# Patient Record
Sex: MALE | Race: WHITE | NOT HISPANIC OR LATINO | URBAN - METROPOLITAN AREA
[De-identification: names, ages, dates, MRNs, and addresses within clinical notes are randomized per-mention and may not be internally consistent; named-entity substitution may affect disease eponyms.]

---

## 2023-07-28 ENCOUNTER — NURSING HOME VISIT (OUTPATIENT)
Dept: POST ACUTE CARE | Facility: EXTERNAL LOCATION | Age: 88
End: 2023-07-28
Payer: MEDICARE

## 2023-07-28 DIAGNOSIS — K92.2 GASTROINTESTINAL HEMORRHAGE, UNSPECIFIED GASTROINTESTINAL HEMORRHAGE TYPE: Primary | ICD-10-CM

## 2023-07-28 DIAGNOSIS — I10 HYPERTENSION, UNSPECIFIED TYPE: ICD-10-CM

## 2023-07-28 PROCEDURE — 99304 1ST NF CARE SF/LOW MDM 25: CPT | Performed by: INTERNAL MEDICINE

## 2023-07-28 NOTE — PROGRESS NOTES
Pt was seen in the NH,88 YO MALE WITH PMH DM2 CAD PE HTN HLD HERE AFTER GI BLEED FOR REHAB,Pt is in usual state , no complaint  General appearance: Comfortable, no distress  ROS: No SOB  Medications reviewed  Head: Normal  Neck: Soft  Heart: Regular  Lungs: Clear  Abdomen: soft    Impression: REHAB SUPPORTIVE CARE, continue current management    Problem List Items Addressed This Visit       Hypertension     Other Visit Diagnoses       Gastrointestinal hemorrhage, unspecified gastrointestinal hemorrhage type    -  Primary

## 2023-07-28 NOTE — LETTER
Patient: Shorty Rasmussen  : 1935    Encounter Date: 2023    Pt was seen in the NH,88 YO MALE WITH PMH DM2 CAD PE HTN HLD HERE AFTER GI BLEED FOR REHAB,Pt is in usual state , no complaint  General appearance: Comfortable, no distress  ROS: No SOB  Medications reviewed  Head: Normal  Neck: Soft  Heart: Regular  Lungs: Clear  Abdomen: soft    Impression: REHAB SUPPORTIVE CARE, continue current management    Problem List Items Addressed This Visit       Hypertension     Other Visit Diagnoses       Gastrointestinal hemorrhage, unspecified gastrointestinal hemorrhage type    -  Primary               Electronically Signed By: Rohan Coulter MD   23 11:24 AM

## 2023-08-07 ENCOUNTER — NURSING HOME VISIT (OUTPATIENT)
Dept: POST ACUTE CARE | Facility: EXTERNAL LOCATION | Age: 88
End: 2023-08-07
Payer: MEDICARE

## 2023-08-07 DIAGNOSIS — R53.1 WEAKNESS: Primary | ICD-10-CM

## 2023-08-07 DIAGNOSIS — I10 HYPERTENSION, UNSPECIFIED TYPE: ICD-10-CM

## 2023-08-07 DIAGNOSIS — E11.9 TYPE 2 DIABETES MELLITUS WITHOUT COMPLICATION, WITHOUT LONG-TERM CURRENT USE OF INSULIN (MULTI): ICD-10-CM

## 2023-08-07 PROCEDURE — 99309 SBSQ NF CARE MODERATE MDM 30: CPT | Performed by: NURSE PRACTITIONER

## 2023-08-07 NOTE — LETTER
Patient: Grady Oro  : 1935    Encounter Date: 2023    Subjective  Patient ID: Grady Oro is a 87 y.o. male who presents for No chief complaint on file..  88 yo male at [facility] with history of htn and weakness.  History also of DMII, ASHD.  Resident working with PT/OT/ST.  Staff verbalizes concern regarding low BP of 107/60 and less.  Resident denies dizziness or near syncope.          Review of Systems   Constitutional:  Negative for fatigue and fever.   Respiratory:  Negative for cough, shortness of breath and wheezing.    Cardiovascular:  Negative for chest pain, palpitations and leg swelling.   Gastrointestinal:  Negative for abdominal distention, abdominal pain, constipation, diarrhea and nausea.   Neurological:  Positive for weakness. Negative for dizziness, syncope and light-headedness.   Psychiatric/Behavioral: Negative.         Objective  Physical Exam  Constitutional:       General: He is not in acute distress.  Cardiovascular:      Rate and Rhythm: Normal rate and regular rhythm.   Pulmonary:      Effort: Pulmonary effort is normal.      Breath sounds: Normal breath sounds.   Abdominal:      General: Bowel sounds are normal.   Musculoskeletal:      Comments: Trace edema noted ankles lorenzo.    Skin:     General: Skin is warm and dry.   Neurological:      Mental Status: He is alert.   Psychiatric:         Mood and Affect: Mood normal.         No current outpatient medications on file.     Assessment/Plan  Problem List Items Addressed This Visit          Cardiac and Vasculature    Hypertension       Endocrine/Metabolic    Diabetes mellitus (CMS/HCC)       Symptoms and Signs    Weakness - Primary   Will discontinue lisinopril and continue on hydrochlorothiazide.    DM; Currently on Metformin 500mg bid with Hgba1c at 6.6.  Will get BS readings bid and re-eval meds.  Continue to monitor and continue supportive care.             Electronically Signed By: CHARLES Kapoor   23  8:52 AM

## 2023-08-14 PROBLEM — E11.9 DIABETES MELLITUS (MULTI): Status: ACTIVE | Noted: 2023-08-14

## 2023-08-14 PROBLEM — R53.1 WEAKNESS: Status: ACTIVE | Noted: 2023-08-14

## 2023-08-14 ASSESSMENT — ENCOUNTER SYMPTOMS
ABDOMINAL DISTENTION: 0
FATIGUE: 0
DIZZINESS: 0
ABDOMINAL PAIN: 0
PALPITATIONS: 0
SHORTNESS OF BREATH: 0
WEAKNESS: 1
DIARRHEA: 0
CONSTIPATION: 0
PSYCHIATRIC NEGATIVE: 1
WHEEZING: 0
COUGH: 0
FEVER: 0
LIGHT-HEADEDNESS: 0
NAUSEA: 0

## 2023-08-14 NOTE — PROGRESS NOTES
Comments:ANY COMBO Subjective   Patient ID: Grady Oro is a 87 y.o. male who presents for No chief complaint on file..  88 yo male at [facility] with history of htn and weakness.  History also of DMII, ASHD.  Resident working with PT/OT/ST.  Staff verbalizes concern regarding low BP of 107/60 and less.  Resident denies dizziness or near syncope.          Review of Systems   Constitutional:  Negative for fatigue and fever.   Respiratory:  Negative for cough, shortness of breath and wheezing.    Cardiovascular:  Negative for chest pain, palpitations and leg swelling.   Gastrointestinal:  Negative for abdominal distention, abdominal pain, constipation, diarrhea and nausea.   Neurological:  Positive for weakness. Negative for dizziness, syncope and light-headedness.   Psychiatric/Behavioral: Negative.         Objective   Physical Exam  Constitutional:       General: He is not in acute distress.  Cardiovascular:      Rate and Rhythm: Normal rate and regular rhythm.   Pulmonary:      Effort: Pulmonary effort is normal.      Breath sounds: Normal breath sounds.   Abdominal:      General: Bowel sounds are normal.   Musculoskeletal:      Comments: Trace edema noted ankles lorenzo.    Skin:     General: Skin is warm and dry.   Neurological:      Mental Status: He is alert.   Psychiatric:         Mood and Affect: Mood normal.         No current outpatient medications on file.     Assessment/Plan   Problem List Items Addressed This Visit          Cardiac and Vasculature    Hypertension       Endocrine/Metabolic    Diabetes mellitus (CMS/HCC)       Symptoms and Signs    Weakness - Primary   Will discontinue lisinopril and continue on hydrochlorothiazide.    DM; Currently on Metformin 500mg bid with Hgba1c at 6.6.  Will get BS readings bid and re-eval meds.  Continue to monitor and continue supportive care.

## 2023-08-30 ENCOUNTER — NURSING HOME VISIT (OUTPATIENT)
Dept: POST ACUTE CARE | Facility: EXTERNAL LOCATION | Age: 88
End: 2023-08-30
Payer: MEDICARE

## 2023-08-30 DIAGNOSIS — I10 HYPERTENSION, UNSPECIFIED TYPE: ICD-10-CM

## 2023-08-30 DIAGNOSIS — R53.81 DEBILITY: Primary | ICD-10-CM

## 2023-08-30 PROCEDURE — 99308 SBSQ NF CARE LOW MDM 20: CPT | Performed by: INTERNAL MEDICINE

## 2023-08-30 NOTE — LETTER
Patient: Grady Oro  : 1935    Encounter Date: 2023    Pt was seen in the NH.  Pt is in usual state , no complaint  General appearance: Comfortable, no distress  ROS: No SOB  Medications reviewed  Head: Normal  Neck: Soft  Heart: Regular  Lungs: Clear  Abdomen: soft    Impression: clinically doing fine, continue current management    Problem List Items Addressed This Visit       Hypertension    Debility - Primary          Electronically Signed By: Rohan Coulter MD   23  8:49 PM

## 2023-08-31 NOTE — PROGRESS NOTES
Pt was seen in the NH.  Pt is in usual state , no complaint  General appearance: Comfortable, no distress  ROS: No SOB  Medications reviewed  Head: Normal  Neck: Soft  Heart: Regular  Lungs: Clear  Abdomen: soft    Impression: clinically doing fine, continue current management    Problem List Items Addressed This Visit       Hypertension    Debility - Primary

## 2023-09-05 ENCOUNTER — NURSING HOME VISIT (OUTPATIENT)
Dept: POST ACUTE CARE | Facility: EXTERNAL LOCATION | Age: 88
End: 2023-09-05
Payer: MEDICARE

## 2023-09-05 DIAGNOSIS — E11.9 TYPE 2 DIABETES MELLITUS WITHOUT COMPLICATION, WITHOUT LONG-TERM CURRENT USE OF INSULIN (MULTI): Primary | ICD-10-CM

## 2023-09-05 DIAGNOSIS — J18.9 PNEUMONIA OF RIGHT LOWER LOBE DUE TO INFECTIOUS ORGANISM: ICD-10-CM

## 2023-09-05 PROCEDURE — 99308 SBSQ NF CARE LOW MDM 20: CPT | Performed by: NURSE PRACTITIONER

## 2023-09-05 NOTE — LETTER
Patient: Grady Oro  : 1935    Encounter Date: 2023    Subjective  Patient ID: Grady Oro is a 87 y.o. male who presents for No chief complaint on file..  86 yo male at [facility] recently diagnosed with pneumonia in RLL.  Family present in room.  Denies any c/o.  BS reviewed.        Review of Systems   Constitutional:  Negative for fever.   Respiratory:  Positive for wheezing. Negative for cough and shortness of breath.    Cardiovascular: Negative.    Gastrointestinal: Negative.        Objective  Physical Exam  Constitutional:       General: He is not in acute distress.  Cardiovascular:      Rate and Rhythm: Normal rate and regular rhythm.   Pulmonary:      Effort: Pulmonary effort is normal.      Breath sounds: No wheezing or rales.      Comments: Wheezing and rales noted in RLL on expiration.  Abdominal:      General: Bowel sounds are normal.   Skin:     General: Skin is warm and dry.   Neurological:      Mental Status: He is alert.         No current outpatient medications on file.     Assessment/Plan  Problem List Items Addressed This Visit          Endocrine/Metabolic    Diabetes mellitus (CMS/HCC) - Primary       Pulmonary and Pneumonias    Pneumonia of right lower lobe due to infectious organism   BS ranging 110-330 today, improved from over the weekend after adjustments made to insulin.  Will continue to monitor and adjust.    Pneumonia;  Currently on prednisone and ATB.  Continue to monitor.             Electronically Signed By: CHARLES Kapoor   23 11:13 AM

## 2023-09-07 PROBLEM — J18.9 PNEUMONIA OF RIGHT LOWER LOBE DUE TO INFECTIOUS ORGANISM: Status: ACTIVE | Noted: 2023-09-07

## 2023-09-07 ASSESSMENT — ENCOUNTER SYMPTOMS
COUGH: 0
WHEEZING: 1
GASTROINTESTINAL NEGATIVE: 1
CARDIOVASCULAR NEGATIVE: 1
FEVER: 0
SHORTNESS OF BREATH: 0

## 2023-09-07 NOTE — PROGRESS NOTES
Subjective   Patient ID: Grady Oro is a 87 y.o. male who presents for No chief complaint on file..  86 yo male at [facility] recently diagnosed with pneumonia in RLL.  Family present in room.  Denies any c/o.  BS reviewed.        Review of Systems   Constitutional:  Negative for fever.   Respiratory:  Positive for wheezing. Negative for cough and shortness of breath.    Cardiovascular: Negative.    Gastrointestinal: Negative.        Objective   Physical Exam  Constitutional:       General: He is not in acute distress.  Cardiovascular:      Rate and Rhythm: Normal rate and regular rhythm.   Pulmonary:      Effort: Pulmonary effort is normal.      Breath sounds: No wheezing or rales.      Comments: Wheezing and rales noted in RLL on expiration.  Abdominal:      General: Bowel sounds are normal.   Skin:     General: Skin is warm and dry.   Neurological:      Mental Status: He is alert.         No current outpatient medications on file.     Assessment/Plan   Problem List Items Addressed This Visit          Endocrine/Metabolic    Diabetes mellitus (CMS/HCC) - Primary       Pulmonary and Pneumonias    Pneumonia of right lower lobe due to infectious organism   BS ranging 110-330 today, improved from over the weekend after adjustments made to insulin.  Will continue to monitor and adjust.    Pneumonia;  Currently on prednisone and ATB.  Continue to monitor.

## 2023-09-21 ENCOUNTER — NURSING HOME VISIT (OUTPATIENT)
Dept: POST ACUTE CARE | Facility: EXTERNAL LOCATION | Age: 88
End: 2023-09-21
Payer: MEDICARE

## 2023-09-21 DIAGNOSIS — R53.81 DEBILITY: Primary | ICD-10-CM

## 2023-09-21 DIAGNOSIS — I10 HYPERTENSION, UNSPECIFIED TYPE: ICD-10-CM

## 2023-09-21 PROCEDURE — 99308 SBSQ NF CARE LOW MDM 20: CPT | Performed by: INTERNAL MEDICINE

## 2023-09-21 NOTE — LETTER
Patient: Grady Oro  : 1935    Encounter Date: 2023    Pt was seen in the NH.  Pt is in usual state , no complaint  General appearance: Comfortable, no distress  ROS: No SOB  Medications reviewed  Head: Normal  Neck: Soft  Heart: Regular  Lungs: Clear  Abdomen: soft    Impression: clinically doing fine, continue current management    Problem List Items Addressed This Visit       Hypertension    Debility - Primary          Electronically Signed By: Rohan Coulter MD   23  9:46 PM

## 2023-10-02 ENCOUNTER — NURSING HOME VISIT (OUTPATIENT)
Dept: POST ACUTE CARE | Facility: EXTERNAL LOCATION | Age: 88
End: 2023-10-02
Payer: MEDICARE

## 2023-10-02 DIAGNOSIS — L73.9 FOLLICULITIS: ICD-10-CM

## 2023-10-02 DIAGNOSIS — E11.9 TYPE 2 DIABETES MELLITUS WITHOUT COMPLICATION, WITHOUT LONG-TERM CURRENT USE OF INSULIN (MULTI): Primary | ICD-10-CM

## 2023-10-02 PROCEDURE — 99308 SBSQ NF CARE LOW MDM 20: CPT | Performed by: NURSE PRACTITIONER

## 2023-10-02 NOTE — LETTER
Patient: Grady Oro  : 1935    Encounter Date: 10/02/2023    Subjective  Patient ID: Grady Oro is a 88 y.o. male who presents for No chief complaint on file..  87 yo male at [facility] with history of DM.  Daughter is present and requesting evaluation of rash that has been on face for a few days.  Daughter states she noted the rash on face is worse after shaving.  Daughter also states resident's BS low in am and higher throughout the day.  Resident denies any pain.         Review of Systems   Constitutional:  Negative for appetite change and fever.   Respiratory: Negative.     Cardiovascular: Negative.    Gastrointestinal: Negative.    Endocrine: Negative.    Skin:  Positive for rash.       Objective  Physical Exam  Constitutional:       General: He is not in acute distress.  Cardiovascular:      Rate and Rhythm: Rhythm irregular.   Pulmonary:      Effort: Pulmonary effort is normal.      Breath sounds: Normal breath sounds. No wheezing, rhonchi or rales.   Neurological:      Mental Status: He is alert.         No current outpatient medications on file.     Assessment/Plan  Problem List Items Addressed This Visit          Endocrine/Metabolic    Diabetes mellitus (CMS/HCC) - Primary       Skin    Folliculitis   DM:  Stop 8pm sliding scale.  Increase Lantus 30 units at hs.  Continue to monitor BS readings.  Folliculitis;  Start on Keflex 500mg bid x 7 days or until healed.             Electronically Signed By: CHARLES Kapoor   10/3/23  2:49 PM

## 2023-10-03 PROBLEM — L73.9 FOLLICULITIS: Status: ACTIVE | Noted: 2023-10-03

## 2023-10-03 ASSESSMENT — ENCOUNTER SYMPTOMS
RESPIRATORY NEGATIVE: 1
GASTROINTESTINAL NEGATIVE: 1
APPETITE CHANGE: 0
CARDIOVASCULAR NEGATIVE: 1
ENDOCRINE NEGATIVE: 1
FEVER: 0

## 2023-10-03 NOTE — PROGRESS NOTES
Subjective   Patient ID: Grady Oro is a 88 y.o. male who presents for No chief complaint on file..  87 yo male at [facility] with history of DM.  Daughter is present and requesting evaluation of rash that has been on face for a few days.  Daughter states she noted the rash on face is worse after shaving.  Daughter also states resident's BS low in am and higher throughout the day.  Resident denies any pain.         Review of Systems   Constitutional:  Negative for appetite change and fever.   Respiratory: Negative.     Cardiovascular: Negative.    Gastrointestinal: Negative.    Endocrine: Negative.    Skin:  Positive for rash.       Objective   Physical Exam  Constitutional:       General: He is not in acute distress.  Cardiovascular:      Rate and Rhythm: Rhythm irregular.   Pulmonary:      Effort: Pulmonary effort is normal.      Breath sounds: Normal breath sounds. No wheezing, rhonchi or rales.   Neurological:      Mental Status: He is alert.         No current outpatient medications on file.     Assessment/Plan   Problem List Items Addressed This Visit          Endocrine/Metabolic    Diabetes mellitus (CMS/HCC) - Primary       Skin    Folliculitis   DM:  Stop 8pm sliding scale.  Increase Lantus 30 units at hs.  Continue to monitor BS readings.  Folliculitis;  Start on Keflex 500mg bid x 7 days or until healed.

## 2023-10-05 ENCOUNTER — NURSING HOME VISIT (OUTPATIENT)
Dept: POST ACUTE CARE | Facility: EXTERNAL LOCATION | Age: 88
End: 2023-10-05
Payer: MEDICARE

## 2023-10-05 DIAGNOSIS — L73.9 FOLLICULITIS: Primary | ICD-10-CM

## 2023-10-05 PROCEDURE — 99307 SBSQ NF CARE SF MDM 10: CPT | Performed by: NURSE PRACTITIONER

## 2023-10-05 ASSESSMENT — ENCOUNTER SYMPTOMS
FEVER: 0
CARDIOVASCULAR NEGATIVE: 1
CHILLS: 0

## 2023-10-05 NOTE — LETTER
Patient: Grady Oro  : 1935    Encounter Date: 10/05/2023    Subjective  Patient ID: Grady Oro is a 88 y.o. male who presents for No chief complaint on file..  89 yo male with history of dm and folliculitis.  Resident states he is feeling better.  Wife is present in room and states his skin is not as erythematous as previously.  Resident denies pain or itching.          Review of Systems   Constitutional:  Negative for chills and fever.   Cardiovascular: Negative.    Skin:  Positive for rash.       Objective  Physical Exam  Constitutional:       General: He is not in acute distress.  Cardiovascular:      Rate and Rhythm: Rhythm irregular.   Pulmonary:      Breath sounds: No wheezing, rhonchi or rales.      Comments: Diminished on expiration.  Poor effort.   Skin:     General: Skin is warm and dry.      Findings: Erythema present.      Comments: Right side of face continues with fine raised areas, erythematous however, improved from previous exam.  Forehead and scalp improved.     Neurological:      Mental Status: He is alert.         No current outpatient medications on file.     Assessment/Plan  Problem List Items Addressed This Visit          Skin    Folliculitis - Primary   Continue current regimen.  Continue to monitor.             Electronically Signed By: CHARLES Kapoor   10/5/23  4:28 PM

## 2023-10-05 NOTE — PROGRESS NOTES
Subjective   Patient ID: Grady Oro is a 88 y.o. male who presents for No chief complaint on file..  89 yo male with history of dm and folliculitis.  Resident states he is feeling better.  Wife is present in room and states his skin is not as erythematous as previously.  Resident denies pain or itching.          Review of Systems   Constitutional:  Negative for chills and fever.   Cardiovascular: Negative.    Skin:  Positive for rash.       Objective   Physical Exam  Constitutional:       General: He is not in acute distress.  Cardiovascular:      Rate and Rhythm: Rhythm irregular.   Pulmonary:      Breath sounds: No wheezing, rhonchi or rales.      Comments: Diminished on expiration.  Poor effort.   Skin:     General: Skin is warm and dry.      Findings: Erythema present.      Comments: Right side of face continues with fine raised areas, erythematous however, improved from previous exam.  Forehead and scalp improved.     Neurological:      Mental Status: He is alert.         No current outpatient medications on file.     Assessment/Plan   Problem List Items Addressed This Visit          Skin    Folliculitis - Primary   Continue current regimen.  Continue to monitor.

## 2023-10-24 ENCOUNTER — NURSING HOME VISIT (OUTPATIENT)
Dept: POST ACUTE CARE | Facility: EXTERNAL LOCATION | Age: 88
End: 2023-10-24
Payer: MEDICARE

## 2023-10-24 DIAGNOSIS — R05.9 COUGH, UNSPECIFIED TYPE: Primary | ICD-10-CM

## 2023-10-24 DIAGNOSIS — J18.9 PNEUMONIA OF RIGHT LOWER LOBE DUE TO INFECTIOUS ORGANISM: ICD-10-CM

## 2023-10-24 PROCEDURE — 99308 SBSQ NF CARE LOW MDM 20: CPT | Performed by: NURSE PRACTITIONER

## 2023-10-24 NOTE — LETTER
Patient: Grady Oro  : 1935    Encounter Date: 10/24/2023    Subjective  Patient ID: Grady Oro is a 88 y.o. male who presents for No chief complaint on file..  89 yo male at [facility] with history of copd.  Staff reports resident started coughing yesterday.  Resident in chair in room.  Wife present.  States he has cough, non-productive x couple days.          Review of Systems   Constitutional:  Negative for chills and fever.   HENT:  Negative for congestion.    Respiratory:  Positive for cough and shortness of breath. Negative for wheezing.    Cardiovascular:  Negative for chest pain, palpitations and leg swelling.       Objective  Physical Exam  Constitutional:       General: He is not in acute distress.  Cardiovascular:      Rate and Rhythm: Normal rate and regular rhythm.   Pulmonary:      Effort: Pulmonary effort is normal. No respiratory distress.      Breath sounds: Rhonchi present.      Comments: Rhonchi heard anteriorly and posteriorly on expiration.  Moist cough noted.   Skin:     General: Skin is warm and dry.   Neurological:      Mental Status: He is alert.         No current outpatient medications on file.     Assessment/Plan  Problem List Items Addressed This Visit          Pulmonary and Pneumonias    Pneumonia of right lower lobe due to infectious organism    Cough - Primary   Start on tapering dosage of Prednisone and Tessalon Perles.  Get CXR to rule out pneumonia.           Electronically Signed By: CHARLES Kapoor   10/25/23  3:13 PM

## 2023-10-25 ENCOUNTER — NURSING HOME VISIT (OUTPATIENT)
Dept: POST ACUTE CARE | Facility: EXTERNAL LOCATION | Age: 88
End: 2023-10-25
Payer: MEDICARE

## 2023-10-25 DIAGNOSIS — J98.11 ATELECTASIS: ICD-10-CM

## 2023-10-25 DIAGNOSIS — J18.9 PNEUMONIA OF RIGHT UPPER LOBE DUE TO INFECTIOUS ORGANISM: Primary | ICD-10-CM

## 2023-10-25 PROBLEM — R05.9 COUGH: Status: ACTIVE | Noted: 2023-10-25

## 2023-10-25 PROCEDURE — 99309 SBSQ NF CARE MODERATE MDM 30: CPT | Performed by: NURSE PRACTITIONER

## 2023-10-25 ASSESSMENT — ENCOUNTER SYMPTOMS
SHORTNESS OF BREATH: 1
FEVER: 0
COUGH: 1
PALPITATIONS: 0
WHEEZING: 0
CHILLS: 0

## 2023-10-25 NOTE — PROGRESS NOTES
Subjective   Patient ID: Grady Oro is a 88 y.o. male who presents for No chief complaint on file..  87 yo male at [facility] with history of copd.  Staff reports resident started coughing yesterday.  Resident in chair in room.  Wife present.  States he has cough, non-productive x couple days.          Review of Systems   Constitutional:  Negative for chills and fever.   HENT:  Negative for congestion.    Respiratory:  Positive for cough and shortness of breath. Negative for wheezing.    Cardiovascular:  Negative for chest pain, palpitations and leg swelling.       Objective   Physical Exam  Constitutional:       General: He is not in acute distress.  Cardiovascular:      Rate and Rhythm: Normal rate and regular rhythm.   Pulmonary:      Effort: Pulmonary effort is normal. No respiratory distress.      Breath sounds: Rhonchi present.      Comments: Rhonchi heard anteriorly and posteriorly on expiration.  Moist cough noted.   Skin:     General: Skin is warm and dry.   Neurological:      Mental Status: He is alert.         No current outpatient medications on file.     Assessment/Plan   Problem List Items Addressed This Visit          Pulmonary and Pneumonias    Pneumonia of right lower lobe due to infectious organism    Cough - Primary   Start on tapering dosage of Prednisone and Tessalon Perles.  Get CXR to rule out pneumonia.

## 2023-10-25 NOTE — LETTER
Patient: Grady Oro  : 1935    Encounter Date: 10/25/2023    Subjective  Patient ID: Grady Oro is a 88 y.o. male who presents for No chief complaint on file..  89 yo male at [facility] with history of copd, dementia.  Reviewed CXR (recently ordered due to abnormal lung sounds and cough) with resident, wife and daughter.          Review of Systems   Constitutional:  Negative for chills and fever.   Respiratory:  Positive for cough and wheezing. Negative for shortness of breath.    Cardiovascular:  Negative for chest pain, palpitations and leg swelling.   Gastrointestinal: Negative.        Objective  Physical Exam  Constitutional:       General: He is not in acute distress.  Cardiovascular:      Rate and Rhythm: Normal rate and regular rhythm.   Pulmonary:      Effort: Pulmonary effort is normal.      Breath sounds: Wheezing and rhonchi present.      Comments: 02 2L NC.  Pulse ox running 94-95%  Abdominal:      General: Bowel sounds are normal.   Skin:     General: Skin is warm and dry.   Neurological:      Mental Status: He is alert.         No current outpatient medications on file.     Assessment/Plan  Problem List Items Addressed This Visit          Pulmonary and Pneumonias    Pneumonia of right upper lobe due to infectious organism - Primary    Atelectasis   Chest x-ray reviewed.  Start on Doxycycline 100mg bid, Guiafenesin, Incentive spirometry.  Get CT of chest w/o contrast.  Resident with previous history of PE.  Continue to monitor.                Electronically Signed By: CHARLES Kapoor   10/29/23  9:41 PM

## 2023-10-26 ENCOUNTER — HOSPITAL ENCOUNTER (OUTPATIENT)
Dept: RADIOLOGY | Facility: HOSPITAL | Age: 88
Discharge: HOME | End: 2023-10-26
Payer: MEDICARE

## 2023-10-26 DIAGNOSIS — J98.11 ATELECTASIS: ICD-10-CM

## 2023-10-26 PROCEDURE — 71250 CT THORAX DX C-: CPT | Performed by: RADIOLOGY

## 2023-10-26 PROCEDURE — 71250 CT THORAX DX C-: CPT

## 2023-10-29 PROBLEM — J98.11 ATELECTASIS: Status: ACTIVE | Noted: 2023-10-29

## 2023-10-29 ASSESSMENT — ENCOUNTER SYMPTOMS
SHORTNESS OF BREATH: 0
GASTROINTESTINAL NEGATIVE: 1
WHEEZING: 1
COUGH: 1
PALPITATIONS: 0
FEVER: 0
CHILLS: 0

## 2023-10-30 ENCOUNTER — NURSING HOME VISIT (OUTPATIENT)
Dept: POST ACUTE CARE | Facility: EXTERNAL LOCATION | Age: 88
End: 2023-10-30
Payer: MEDICARE

## 2023-10-30 DIAGNOSIS — J18.9 PNEUMONIA OF RIGHT UPPER LOBE DUE TO INFECTIOUS ORGANISM: Primary | ICD-10-CM

## 2023-10-30 PROCEDURE — 99309 SBSQ NF CARE MODERATE MDM 30: CPT | Performed by: NURSE PRACTITIONER

## 2023-10-30 NOTE — PROGRESS NOTES
Subjective   Patient ID: Grady Oro is a 88 y.o. male who presents for No chief complaint on file..  89 yo male at \[facility]\"" with history of copd, dementia.  Reviewed CXR (recently ordered due to abnormal lung sounds and cough) with resident, wife and daughter.          Review of Systems   Constitutional:  Negative for chills and fever.   Respiratory:  Positive for cough and wheezing. Negative for shortness of breath.    Cardiovascular:  Negative for chest pain, palpitations and leg swelling.   Gastrointestinal: Negative.        Objective   Physical Exam  Constitutional:       General: He is not in acute distress.  Cardiovascular:      Rate and Rhythm: Normal rate and regular rhythm.   Pulmonary:      Effort: Pulmonary effort is normal.      Breath sounds: Wheezing and rhonchi present.      Comments: 02 2L NC.  Pulse ox running 94-95%  Abdominal:      General: Bowel sounds are normal.   Skin:     General: Skin is warm and dry.   Neurological:      Mental Status: He is alert.         No current outpatient medications on file.     Assessment/Plan   Problem List Items Addressed This Visit          Pulmonary and Pneumonias    Pneumonia of right upper lobe due to infectious organism - Primary    Atelectasis   Chest x-ray reviewed.  Start on Doxycycline 100mg bid, Guiafenesin, Incentive spirometry.  Get CT of chest w/o contrast.  Resident with previous history of PE.  Continue to monitor.

## 2023-10-30 NOTE — LETTER
Patient: Grady Oro  : 1935    Encounter Date: 10/30/2023    Subjective  Patient ID: Grady Oro is a 88 y.o. male who presents for No chief complaint on file..  [facility] of 87 [facility] with recent diagnosis of upper and lower lung pneumonia.  Wife and daughter present in room.  Discussed test results of CT.          Review of Systems   Constitutional:  Negative for chills and fever.   HENT:  Negative for congestion.    Respiratory:  Positive for cough and wheezing.    Cardiovascular:  Positive for leg swelling. Negative for chest pain.   Gastrointestinal:  Negative for diarrhea, nausea and vomiting.       Objective  Physical Exam  Constitutional:       General: He is not in acute distress.  Cardiovascular:      Rate and Rhythm: Normal rate and regular rhythm.   Pulmonary:      Breath sounds: Wheezing present.      Comments: Wheezing heard posteriorly on expiration.  Lungs diminished RLL.  Resident on 2L 02.  Encouraged resident to get up out of chair and bed and walk with assistance.  Encouraged resident to use incentive spirometry.    Abdominal:      General: Bowel sounds are normal.   Skin:     General: Skin is warm and dry.   Neurological:      Mental Status: He is alert.         No current outpatient medications on file.     Assessment/Plan  Problem List Items Addressed This Visit          Pulmonary and Pneumonias    Pneumonia of right upper lobe due to infectious organism - Primary   Get sputum culture.  Continue current regimen of medications.  Encouraged resident to use incentive spirometry.  Will repeat CT of chest in 2-3 wks as recommended.  Continue to monitor.             Electronically Signed By: CHARLES Kapoor   23  7:05 AM

## 2023-11-02 ENCOUNTER — NURSING HOME VISIT (OUTPATIENT)
Dept: POST ACUTE CARE | Facility: EXTERNAL LOCATION | Age: 88
End: 2023-11-02
Payer: MEDICARE

## 2023-11-02 DIAGNOSIS — J18.9 PNEUMONIA OF RIGHT UPPER LOBE DUE TO INFECTIOUS ORGANISM: Primary | ICD-10-CM

## 2023-11-02 PROCEDURE — 99308 SBSQ NF CARE LOW MDM 20: CPT | Performed by: NURSE PRACTITIONER

## 2023-11-02 ASSESSMENT — ENCOUNTER SYMPTOMS
VOMITING: 0
CHILLS: 0
FEVER: 0
DIARRHEA: 0
NAUSEA: 0
COUGH: 1
SHORTNESS OF BREATH: 0
DIARRHEA: 0
VOMITING: 0
WHEEZING: 1
FEVER: 0
WHEEZING: 0
COUGH: 0
NAUSEA: 0
CHILLS: 0

## 2023-11-02 NOTE — PROGRESS NOTES
Subjective   Patient ID: Grady Oro is a 88 y.o. male who presents for No chief complaint on file..  Follow up of 89 [facility] male at [facility] with recent diagnosis of upper and lower lung pneumonia.  Wife and daughter present in room.  Discussed test results of CT.          Review of Systems   Constitutional:  Negative for chills and fever.   HENT:  Negative for congestion.    Respiratory:  Positive for cough and wheezing.    Cardiovascular:  Positive for leg swelling. Negative for chest pain.   Gastrointestinal:  Negative for diarrhea, nausea and vomiting.       Objective   Physical Exam  Constitutional:       General: He is not in acute distress.  Cardiovascular:      Rate and Rhythm: Normal rate and regular rhythm.   Pulmonary:      Breath sounds: Wheezing present.      Comments: Wheezing heard posteriorly on expiration.  Lungs diminished RLL.  Resident on 2L 02.  Encouraged resident to get up out of chair and bed and walk with assistance.  Encouraged resident to use incentive spirometry.    Abdominal:      General: Bowel sounds are normal.   Skin:     General: Skin is warm and dry.   Neurological:      Mental Status: He is alert.         No current outpatient medications on file.     Assessment/Plan   Problem List Items Addressed This Visit          Pulmonary and Pneumonias    Pneumonia of right upper lobe due to infectious organism - Primary   Get sputum culture.  Continue current regimen of medications.  Encouraged resident to use incentive spirometry.  Will repeat CT of chest in 2-3 wks as recommended.  Continue to monitor.

## 2023-11-02 NOTE — PROGRESS NOTES
Subjective   Patient ID: Grady Oro is a 88 y.o. male who presents for No chief complaint on file..  Follow up on 87 yo male at [facility] with Rt lobe pneumonia.  Resident sitting up in bed.  States he is feeling much better.  States his cough has resolved. States he has been using incentive spirometry as ordered, about 4 x per day.          Review of Systems   Constitutional:  Negative for chills and fever.   Respiratory:  Negative for cough, shortness of breath and wheezing.    Cardiovascular:  Negative for chest pain and leg swelling.   Gastrointestinal:  Negative for diarrhea, nausea and vomiting.       Objective   Physical Exam  Constitutional:       General: He is not in acute distress.  Cardiovascular:      Rate and Rhythm: Rhythm irregular.      Heart sounds: Murmur heard.   Pulmonary:      Effort: Pulmonary effort is normal.      Breath sounds: Wheezing present. No rhonchi or rales.      Comments: Fine wheezing heard lower bases posteriorly.  No wheezing, rales or rhonchi anteriorly.  No coughing noted during interview and exam.  Improved .    Neurological:      Mental Status: He is alert.         No current outpatient medications on file.     Assessment/Plan   Problem List Items Addressed This Visit          Pulmonary and Pneumonias    Pneumonia of right upper lobe due to infectious organism - Primary   Improving.  Continue current regimen including incentive spirometry qid.  Will continue to monitor.

## 2023-11-02 NOTE — LETTER
Patient: Grady Oro  : 1935    Encounter Date: 2023    Subjective  Patient ID: Grady Oro is a 88 y.o. male who presents for No chief complaint on file..  [facility] 89 [facility].  Resident sitting up in bed.  States he is feeling much better.  States his cough has resolved. States he has been using incentive spirometry as ordered, about 4 x per day.          Review of Systems   Constitutional:  Negative for chills and fever.   Respiratory:  Negative for cough, shortness of breath and wheezing.    Cardiovascular:  Negative for chest pain and leg swelling.   Gastrointestinal:  Negative for diarrhea, nausea and vomiting.       Objective  Physical Exam  Constitutional:       General: He is not in acute distress.  Cardiovascular:      Rate and Rhythm: Rhythm irregular.      Heart sounds: Murmur heard.   Pulmonary:      Effort: Pulmonary effort is normal.      Breath sounds: Wheezing present. No rhonchi or rales.      Comments: Fine wheezing heard lower bases posteriorly.  No wheezing, rales or rhonchi anteriorly.  No coughing noted during interview and exam.  Improved .    Neurological:      Mental Status: He is alert.         No current outpatient medications on file.     Assessment/Plan  Problem List Items Addressed This Visit          Pulmonary and Pneumonias    Pneumonia of right upper lobe due to infectious organism - Primary   Improving.  Continue current regimen including incentive spirometry qid.  Will continue to monitor.             Electronically Signed By: CHARLES Kapoor   23  2:06 PM

## 2023-11-21 ENCOUNTER — NURSING HOME VISIT (OUTPATIENT)
Dept: POST ACUTE CARE | Facility: EXTERNAL LOCATION | Age: 88
End: 2023-11-21
Payer: MEDICARE

## 2023-11-21 DIAGNOSIS — E11.9 TYPE 2 DIABETES MELLITUS WITHOUT COMPLICATION, WITHOUT LONG-TERM CURRENT USE OF INSULIN (MULTI): ICD-10-CM

## 2023-11-21 DIAGNOSIS — J18.9 PNEUMONIA OF RIGHT UPPER LOBE DUE TO INFECTIOUS ORGANISM: Primary | ICD-10-CM

## 2023-11-21 PROCEDURE — 99308 SBSQ NF CARE LOW MDM 20: CPT | Performed by: NURSE PRACTITIONER

## 2023-11-21 ASSESSMENT — ENCOUNTER SYMPTOMS
COUGH: 0
PALPITATIONS: 0
WHEEZING: 0
FEVER: 0
GASTROINTESTINAL NEGATIVE: 1
SHORTNESS OF BREATH: 0

## 2023-11-21 NOTE — LETTER
Patient: Grady Oro  : 1935    Encounter Date: 2023    Subjective  Patient ID: Grady Oro is a 88 y.o. male who presents for No chief complaint on file..  87 yo male at [facility] with recent pneumonia.  Resident verbalized feeling of congestion yesterday to staff.  Resident sitting in room today and denies any fever chills, cough, congestion, or cough.  Reviewed CXR with resident.          Review of Systems   Constitutional:  Negative for fever.   Respiratory:  Negative for cough, shortness of breath and wheezing.    Cardiovascular:  Negative for chest pain, palpitations and leg swelling.   Gastrointestinal: Negative.        Objective  Physical Exam  Constitutional:       General: He is not in acute distress.  Cardiovascular:      Rate and Rhythm: Normal rate and regular rhythm.   Pulmonary:      Effort: Pulmonary effort is normal.      Breath sounds: Normal breath sounds.   Neurological:      Mental Status: He is alert.         No current outpatient medications on file.     Assessment/Plan  Problem List Items Addressed This Visit          Endocrine/Metabolic    Diabetes mellitus (CMS/HCC)       Pulmonary and Pneumonias    Pneumonia of right upper lobe due to infectious organism - Primary   Pneumonia resolved.  Pulse ox 95%.  Will schedule residents breathing treatments BID.    DM;  BS reviewed and average in 200's.  Will increase his Lantus to 35 units at hs.    Continue to monitor.             Electronically Signed By: CHARLES Kapoor   23  3:22 PM

## 2023-11-21 NOTE — PROGRESS NOTES
Subjective   Patient ID: Grady Oro is a 88 y.o. male who presents for No chief complaint on file..  87 [facility] male at [facility] with recent pneumonia.  Resident verbalized feeling of congestion yesterday to staff.  Resident sitting in room today and denies any fever chills, cough, congestion, or cough.  Reviewed CXR with resident.          Review of Systems   Constitutional:  Negative for fever.   Respiratory:  Negative for cough, shortness of breath and wheezing.    Cardiovascular:  Negative for chest pain, palpitations and leg swelling.   Gastrointestinal: Negative.        Objective   Physical Exam  Constitutional:       General: He is not in acute distress.  Cardiovascular:      Rate and Rhythm: Normal rate and regular rhythm.   Pulmonary:      Effort: Pulmonary effort is normal.      Breath sounds: Normal breath sounds.   Neurological:      Mental Status: He is alert.         No current outpatient medications on file.     Assessment/Plan   Problem List Items Addressed This Visit          Endocrine/Metabolic    Diabetes mellitus (CMS/HCC)       Pulmonary and Pneumonias    Pneumonia of right upper lobe due to infectious organism - Primary   Pneumonia resolved.  Pulse ox 95%.  Will schedule residents breathing treatments BID.    DM;  BS reviewed and average in 200's.  Will increase his Lantus to 35 units at hs.    Continue to monitor.

## 2023-12-06 ENCOUNTER — NURSING HOME VISIT (OUTPATIENT)
Dept: POST ACUTE CARE | Facility: EXTERNAL LOCATION | Age: 88
End: 2023-12-06
Payer: MEDICARE

## 2023-12-06 DIAGNOSIS — F32.89 OTHER DEPRESSION: Primary | ICD-10-CM

## 2023-12-06 PROCEDURE — 99309 SBSQ NF CARE MODERATE MDM 30: CPT | Performed by: NURSE PRACTITIONER

## 2023-12-06 NOTE — LETTER
Patient: Grady Oro  : 1935    Encounter Date: 2023    Subjective  Patient ID: Grady Oro is a 88 y.o. male who presents for No chief complaint on file..  87 yo male at [facility].  Staff reports resident is showing S&S of depression.  Resident in room with wife.  Resident denies any sadness, frequent crying, hopelessness.  Wife states resident has a good appetite and sleeps a good part of the afternoon.  Wife states he slept a lot when they lived at home.  Staff reports resident verbalizes outside of the room, away from his wife, he has no reason to live.  He cannot do the things he used to do.  Has discussed with MA in private that wife talks disrespectfully to him.          Review of Systems   Respiratory: Negative.     Cardiovascular: Negative.    Psychiatric/Behavioral:  Positive for dysphoric mood. Negative for agitation, behavioral problems, confusion and self-injury. The patient is not nervous/anxious.        Objective  Physical Exam  Cardiovascular:      Rate and Rhythm: Normal rate and regular rhythm.   Pulmonary:      Effort: Pulmonary effort is normal.      Breath sounds: Normal breath sounds.   Neurological:      Mental Status: He is alert.   Psychiatric:         Mood and Affect: Mood is not anxious. Affect is flat. Affect is not tearful.         Speech: He is noncommunicative.         Behavior: Behavior is slowed. Behavior is not agitated.         Thought Content: Thought content does not include suicidal plan.      Comments: During interview, resident has a somewhat flat affect. Smiles occasionally during conversation but does not talk much and most of the time during conversation, wife will answer questions and resident will not answer questioning.           No current outpatient medications on file.     Assessment/Plan  Problem List Items Addressed This Visit    None  Visit Diagnoses       Other depression    -  Primary        Will start him on Zoloft 25mg and continue to monitor.              Electronically Signed By: LEONIDAS Kapoor-CNP   12/7/23  8:20 AM

## 2023-12-07 ASSESSMENT — ENCOUNTER SYMPTOMS
CARDIOVASCULAR NEGATIVE: 1
DYSPHORIC MOOD: 1
NERVOUS/ANXIOUS: 0
AGITATION: 0
RESPIRATORY NEGATIVE: 1
CONFUSION: 0

## 2023-12-07 NOTE — PROGRESS NOTES
Subjective   Patient ID: Grady Oro is a 88 y.o. male who presents for No chief complaint on file..  89 yo male at [facility].  Staff reports resident is showing S&S of depression.  Resident in room with wife.  Resident denies any sadness, frequent crying, hopelessness.  Wife states resident has a good appetite and sleeps a good part of the afternoon.  Wife states he slept a lot when they lived at home.  Staff reports resident verbalizes outside of the room, away from his wife, he has no reason to live.  He cannot do the things he used to do.  Has discussed with MA in private that wife talks disrespectfully to him.          Review of Systems   Respiratory: Negative.     Cardiovascular: Negative.    Psychiatric/Behavioral:  Positive for dysphoric mood. Negative for agitation, behavioral problems, confusion and self-injury. The patient is not nervous/anxious.        Objective   Physical Exam  Cardiovascular:      Rate and Rhythm: Normal rate and regular rhythm.   Pulmonary:      Effort: Pulmonary effort is normal.      Breath sounds: Normal breath sounds.   Neurological:      Mental Status: He is alert.   Psychiatric:         Mood and Affect: Mood is not anxious. Affect is flat. Affect is not tearful.         Speech: He is noncommunicative.         Behavior: Behavior is slowed. Behavior is not agitated.         Thought Content: Thought content does not include suicidal plan.      Comments: During interview, resident has a somewhat flat affect. Smiles occasionally during conversation but does not talk much and most of the time during conversation, wife will answer questions and resident will not answer questioning.           No current outpatient medications on file.     Assessment/Plan   Problem List Items Addressed This Visit    None  Visit Diagnoses       Other depression    -  Primary        Will start him on Zoloft 25mg and continue to monitor.

## 2023-12-11 ENCOUNTER — NURSING HOME VISIT (OUTPATIENT)
Dept: POST ACUTE CARE | Facility: EXTERNAL LOCATION | Age: 88
End: 2023-12-11
Payer: MEDICARE

## 2023-12-11 DIAGNOSIS — L25.9 CONTACT DERMATITIS, UNSPECIFIED CONTACT DERMATITIS TYPE, UNSPECIFIED TRIGGER: Primary | ICD-10-CM

## 2023-12-11 PROCEDURE — 99308 SBSQ NF CARE LOW MDM 20: CPT | Performed by: NURSE PRACTITIONER

## 2023-12-11 ASSESSMENT — ENCOUNTER SYMPTOMS
CARDIOVASCULAR NEGATIVE: 1
CHILLS: 0
FEVER: 0
RESPIRATORY NEGATIVE: 1

## 2023-12-11 NOTE — LETTER
Patient: Grady Oro  : 1935    Encounter Date: 2023    Subjective  Patient ID: Grady Oro is a 88 y.o. male who presents for No chief complaint on file..  89 yo male at [facility] with history of copd, pneumonia.  Recently started on Zoloft for depression.  Staff reports resident developed a rash this am.  Denies any change in soaps or lotions  Resident denies any itching.          Review of Systems   Constitutional:  Negative for chills and fever.   Respiratory: Negative.     Cardiovascular: Negative.    Skin:  Positive for rash.       Objective  Physical Exam  Constitutional:       General: He is not in acute distress.  Cardiovascular:      Rate and Rhythm: Normal rate and regular rhythm.   Pulmonary:      Effort: Pulmonary effort is normal.      Breath sounds: Normal breath sounds.   Skin:     General: Skin is warm and dry.      Findings: Erythema and rash present.      Comments: Moderate erythema noted to back with fine papules noted.  No rash noted to arms or abd.     Neurological:      Mental Status: He is alert.         No current outpatient medications on file.     Assessment/Plan  Problem List Items Addressed This Visit    None  Visit Diagnoses       Contact dermatitis, unspecified contact dermatitis type, unspecified trigger    -  Primary        Stop Zoloft at this time.  Start on Prednisone 20mg daily x 5 days then 10mg daily x 5 days.  Get labs and continue to monitor.             Electronically Signed By: CHARLES Kapoor   23  2:16 PM

## 2023-12-11 NOTE — PROGRESS NOTES
Subjective   Patient ID: Grady Oro is a 88 y.o. male who presents for No chief complaint on file..  87 yo male at [facility] with history of copd, pneumonia.  Recently started on Zoloft for depression.  Staff reports resident developed a rash this am.  Denies any change in soaps or lotions  Resident denies any itching.          Review of Systems   Constitutional:  Negative for chills and fever.   Respiratory: Negative.     Cardiovascular: Negative.    Skin:  Positive for rash.       Objective   Physical Exam  Constitutional:       General: He is not in acute distress.  Cardiovascular:      Rate and Rhythm: Normal rate and regular rhythm.   Pulmonary:      Effort: Pulmonary effort is normal.      Breath sounds: Normal breath sounds.   Skin:     General: Skin is warm and dry.      Findings: Erythema and rash present.      Comments: Moderate erythema noted to back with fine papules noted.  No rash noted to arms or abd.     Neurological:      Mental Status: He is alert.         No current outpatient medications on file.     Assessment/Plan   Problem List Items Addressed This Visit    None  Visit Diagnoses       Contact dermatitis, unspecified contact dermatitis type, unspecified trigger    -  Primary        Stop Zoloft at this time.  Start on Prednisone 20mg daily x 5 days then 10mg daily x 5 days.  Get labs and continue to monitor.

## 2024-01-08 ENCOUNTER — NURSING HOME VISIT (OUTPATIENT)
Dept: POST ACUTE CARE | Facility: EXTERNAL LOCATION | Age: 89
End: 2024-01-08
Payer: MEDICARE

## 2024-01-08 DIAGNOSIS — L23.5 ALLERGIC DERMATITIS DUE TO OTHER CHEMICAL PRODUCT: Primary | ICD-10-CM

## 2024-01-08 PROCEDURE — 99308 SBSQ NF CARE LOW MDM 20: CPT | Performed by: NURSE PRACTITIONER

## 2024-01-08 NOTE — LETTER
Patient: Grady Oro  : 1935    Encounter Date: 2024    Subjective  Patient ID: Grady Oro is a 88 y.o. male who presents for No chief complaint on file..  89 yo male at [facility]; staff reports resident has developed a rash on his face again after shaving.          Review of Systems   Constitutional:  Negative for fever.   Respiratory: Negative.     Cardiovascular: Negative.    Skin:  Positive for color change and rash.       Objective  Physical Exam  Constitutional:       General: He is not in acute distress.  Cardiovascular:      Rate and Rhythm: Normal rate and regular rhythm.   Pulmonary:      Breath sounds: Normal breath sounds.   Skin:     General: Skin is warm and dry.      Findings: Erythema and rash present.   Neurological:      Mental Status: He is alert.         No current outpatient medications on file.     Assessment/Plan  Problem List Items Addressed This Visit          Skin    Allergic contact dermatitis - Primary   Will start resident on tapering dosage of Prednisone.  Clean razors between shaving.  Stop using shaving cream that continues to irritate skin.  Continue to monitor.             Electronically Signed By: CHARLES Kapoor   24 10:20 AM

## 2024-01-12 PROBLEM — L23.9 ALLERGIC CONTACT DERMATITIS: Status: ACTIVE | Noted: 2024-01-12

## 2024-01-12 ASSESSMENT — ENCOUNTER SYMPTOMS
COLOR CHANGE: 1
RESPIRATORY NEGATIVE: 1
CARDIOVASCULAR NEGATIVE: 1
FEVER: 0

## 2024-01-12 NOTE — PROGRESS NOTES
Subjective   Patient ID: Grady Oro is a 88 y.o. male who presents for No chief complaint on file..  89 yo male at [facility]; staff reports resident has developed a rash on his face again after shaving.          Review of Systems   Constitutional:  Negative for fever.   Respiratory: Negative.     Cardiovascular: Negative.    Skin:  Positive for color change and rash.       Objective   Physical Exam  Constitutional:       General: He is not in acute distress.  Cardiovascular:      Rate and Rhythm: Normal rate and regular rhythm.   Pulmonary:      Breath sounds: Normal breath sounds.   Skin:     General: Skin is warm and dry.      Findings: Erythema and rash present.   Neurological:      Mental Status: He is alert.         No current outpatient medications on file.     Assessment/Plan   Problem List Items Addressed This Visit          Skin    Allergic contact dermatitis - Primary   Will start resident on tapering dosage of Prednisone.  Clean razors between shaving.  Stop using shaving cream that continues to irritate skin.  Continue to monitor.

## 2024-01-24 ENCOUNTER — NURSING HOME VISIT (OUTPATIENT)
Dept: POST ACUTE CARE | Facility: EXTERNAL LOCATION | Age: 89
End: 2024-01-24
Payer: MEDICARE

## 2024-01-24 DIAGNOSIS — I10 HYPERTENSION, UNSPECIFIED TYPE: ICD-10-CM

## 2024-01-24 DIAGNOSIS — E11.9 TYPE 2 DIABETES MELLITUS WITHOUT COMPLICATION, WITHOUT LONG-TERM CURRENT USE OF INSULIN (MULTI): ICD-10-CM

## 2024-01-24 DIAGNOSIS — I26.99 PULMONARY EMBOLISM, UNSPECIFIED CHRONICITY, UNSPECIFIED PULMONARY EMBOLISM TYPE, UNSPECIFIED WHETHER ACUTE COR PULMONALE PRESENT (MULTI): Primary | ICD-10-CM

## 2024-01-24 PROCEDURE — 99308 SBSQ NF CARE LOW MDM 20: CPT | Performed by: INTERNAL MEDICINE

## 2024-01-24 NOTE — LETTER
Patient: Grady Oro  : 1935    Encounter Date: 2024    Pt was seen in the [facility].  Pt is in usual state , no complaint  General appearance: Comfortable, no distress  ROS: No SOB  Medications reviewed  Head: Normal  Neck: Soft  Heart: Regular  Lungs: Clear  Abdomen: soft    Impression: clinically doing fine, continue current management    Problem List Items Addressed This Visit       Hypertension    Diabetes mellitus (CMS/HCC)    Pulmonary embolism, unspecified chronicity, unspecified pulmonary embolism type, unspecified whether acute cor pulmonale present (CMS/HCC) - Primary          Electronically Signed By: Rohan Coulter MD   24  5:12 PM

## 2024-01-24 NOTE — PROGRESS NOTES
Pt was seen in the NH.  Pt is in usual state , no complaint  General appearance: Comfortable, no distress  ROS: No SOB  Medications reviewed  Head: Normal  Neck: Soft  Heart: Regular  Lungs: Clear  Abdomen: soft    Impression: clinically doing fine, continue current management    Problem List Items Addressed This Visit       Hypertension    Diabetes mellitus (CMS/HCC)    Pulmonary embolism, unspecified chronicity, unspecified pulmonary embolism type, unspecified whether acute cor pulmonale present (CMS/HCC) - Primary

## 2024-02-22 ENCOUNTER — NURSING HOME VISIT (OUTPATIENT)
Dept: POST ACUTE CARE | Facility: EXTERNAL LOCATION | Age: 89
End: 2024-02-22
Payer: MEDICARE

## 2024-02-22 DIAGNOSIS — J44.1 COPD EXACERBATION (MULTI): Primary | ICD-10-CM

## 2024-02-22 PROCEDURE — 99308 SBSQ NF CARE LOW MDM 20: CPT | Performed by: INTERNAL MEDICINE

## 2024-02-22 NOTE — LETTER
Patient: Grady Oro  : 1935    Encounter Date: 2024    Pt was seen in the NH.  Pt co cough and SOB  General appearance: Comfortable, no distress  ROS: fatigue  Medications reviewed  Head: Normal  Neck: Soft  Heart: Regular  Lungs: diminish BS , rhonchi  Abdomen: soft    Impression: already on abx will add prednisone, , continue current management    Problem List Items Addressed This Visit    None  Visit Diagnoses       COPD exacerbation (CMS/[facility])    -  Primary               Electronically Signed By: Rohan Coulter MD   24 10:04 PM

## 2024-02-23 NOTE — PROGRESS NOTES
Pt was seen in the NH.  Pt co cough and SOB  General appearance: Comfortable, no distress  ROS: fatigue  Medications reviewed  Head: Normal  Neck: Soft  Heart: Regular  Lungs: diminish BS , rhonchi  Abdomen: soft    Impression: already on abx will add prednisone, , continue current management    Problem List Items Addressed This Visit    None  Visit Diagnoses       COPD exacerbation (CMS/HCC)    -  Primary